# Patient Record
Sex: MALE | Race: WHITE | NOT HISPANIC OR LATINO | URBAN - METROPOLITAN AREA
[De-identification: names, ages, dates, MRNs, and addresses within clinical notes are randomized per-mention and may not be internally consistent; named-entity substitution may affect disease eponyms.]

---

## 2019-06-15 ENCOUNTER — EMERGENCY (EMERGENCY)
Facility: HOSPITAL | Age: 84
LOS: 1 days | Discharge: HOME | End: 2019-06-15
Attending: EMERGENCY MEDICINE | Admitting: EMERGENCY MEDICINE
Payer: MEDICARE

## 2019-06-15 VITALS
SYSTOLIC BLOOD PRESSURE: 184 MMHG | RESPIRATION RATE: 18 BRPM | HEART RATE: 60 BPM | DIASTOLIC BLOOD PRESSURE: 70 MMHG | TEMPERATURE: 98 F | WEIGHT: 149.91 LBS | OXYGEN SATURATION: 100 %

## 2019-06-15 VITALS
SYSTOLIC BLOOD PRESSURE: 152 MMHG | HEART RATE: 83 BPM | RESPIRATION RATE: 18 BRPM | OXYGEN SATURATION: 100 % | DIASTOLIC BLOOD PRESSURE: 61 MMHG
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
## 2019-06-15 DIAGNOSIS — S32.9XXA FRACTURE OF UNSPECIFIED PARTS OF LUMBOSACRAL SPINE AND PELVIS, INITIAL ENCOUNTER FOR CLOSED FRACTURE: ICD-10-CM

## 2019-06-15 DIAGNOSIS — S06.6X0A TRAUMATIC SUBARACHNOID HEMORRHAGE WITHOUT LOSS OF CONSCIOUSNESS, INITIAL ENCOUNTER: ICD-10-CM
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
## 2019-06-15 DIAGNOSIS — Z98.890 OTHER SPECIFIED POSTPROCEDURAL STATES: Chronic | ICD-10-CM

## 2019-06-15 DIAGNOSIS — Z23 ENCOUNTER FOR IMMUNIZATION: ICD-10-CM

## 2019-06-15 DIAGNOSIS — M25.551 PAIN IN RIGHT HIP: ICD-10-CM

## 2019-06-15 LAB
ALBUMIN SERPL ELPH-MCNC: 3.3 G/DL — LOW (ref 3.4–5)
ALP SERPL-CCNC: 61 U/L — SIGNIFICANT CHANGE UP (ref 40–120)
ALT FLD-CCNC: 21 U/L — SIGNIFICANT CHANGE UP (ref 12–42)
ANION GAP SERPL CALC-SCNC: 13 MMOL/L — SIGNIFICANT CHANGE UP (ref 9–16)
APTT BLD: 29 SEC — SIGNIFICANT CHANGE UP (ref 27.5–36.3)
AST SERPL-CCNC: 22 U/L — SIGNIFICANT CHANGE UP (ref 15–37)
BILIRUB SERPL-MCNC: 1.4 MG/DL — HIGH (ref 0.2–1.2)
BUN SERPL-MCNC: 18 MG/DL — SIGNIFICANT CHANGE UP (ref 7–23)
CALCIUM SERPL-MCNC: 9.1 MG/DL — SIGNIFICANT CHANGE UP (ref 8.5–10.5)
CHLORIDE SERPL-SCNC: 106 MMOL/L — SIGNIFICANT CHANGE UP (ref 96–108)
CO2 SERPL-SCNC: 23 MMOL/L — SIGNIFICANT CHANGE UP (ref 22–31)
CREAT SERPL-MCNC: 1.28 MG/DL — SIGNIFICANT CHANGE UP (ref 0.5–1.3)
GLUCOSE SERPL-MCNC: 127 MG/DL — HIGH (ref 70–99)
HCT VFR BLD CALC: 32.8 % — LOW (ref 39–50)
HCT VFR BLD CALC: 37.6 % — LOW (ref 39–50)
HGB BLD-MCNC: 11.2 G/DL — LOW (ref 13–17)
HGB BLD-MCNC: 12.6 G/DL — LOW (ref 13–17)
INR BLD: 1.04 — SIGNIFICANT CHANGE UP (ref 0.88–1.16)
LACTATE SERPL-SCNC: 1.6 MMOL/L — SIGNIFICANT CHANGE UP (ref 0.4–2)
LACTATE SERPL-SCNC: 2.1 MMOL/L — HIGH (ref 0.4–2)
MCHC RBC-ENTMCNC: 31.3 PG — SIGNIFICANT CHANGE UP (ref 27–34)
MCHC RBC-ENTMCNC: 32 PG — SIGNIFICANT CHANGE UP (ref 27–34)
MCHC RBC-ENTMCNC: 33.5 G/DL — SIGNIFICANT CHANGE UP (ref 32–36)
MCHC RBC-ENTMCNC: 34.1 G/DL — SIGNIFICANT CHANGE UP (ref 32–36)
MCV RBC AUTO: 93.3 FL — SIGNIFICANT CHANGE UP (ref 80–100)
MCV RBC AUTO: 93.7 FL — SIGNIFICANT CHANGE UP (ref 80–100)
PLATELET # BLD AUTO: 143 K/UL — LOW (ref 150–400)
PLATELET # BLD AUTO: 170 K/UL — SIGNIFICANT CHANGE UP (ref 150–400)
POTASSIUM SERPL-MCNC: 3.9 MMOL/L — SIGNIFICANT CHANGE UP (ref 3.5–5.3)
POTASSIUM SERPL-SCNC: 3.9 MMOL/L — SIGNIFICANT CHANGE UP (ref 3.5–5.3)
PROT SERPL-MCNC: 6.9 G/DL — SIGNIFICANT CHANGE UP (ref 6.4–8.2)
PROTHROM AB SERPL-ACNC: 11.5 SEC — SIGNIFICANT CHANGE UP (ref 10–12.9)
RBC # BLD: 3.5 M/UL — LOW (ref 4.2–5.8)
RBC # BLD: 4.03 M/UL — LOW (ref 4.2–5.8)
RBC # FLD: 13.7 % — SIGNIFICANT CHANGE UP (ref 10.3–14.5)
RBC # FLD: 13.8 % — SIGNIFICANT CHANGE UP (ref 10.3–14.5)
SODIUM SERPL-SCNC: 142 MMOL/L — SIGNIFICANT CHANGE UP (ref 132–145)
TROPONIN I SERPL-MCNC: <0.017 NG/ML — LOW (ref 0.02–0.06)
TROPONIN I SERPL-MCNC: <0.017 NG/ML — LOW (ref 0.02–0.06)
WBC # BLD: 10.4 K/UL — SIGNIFICANT CHANGE UP (ref 3.8–10.5)
WBC # BLD: 12.2 K/UL — HIGH (ref 3.8–10.5)
WBC # FLD AUTO: 10.4 K/UL — SIGNIFICANT CHANGE UP (ref 3.8–10.5)
WBC # FLD AUTO: 12.2 K/UL — HIGH (ref 3.8–10.5)

## 2019-06-15 PROCEDURE — 74177 CT ABD & PELVIS W/CONTRAST: CPT | Mod: 26

## 2019-06-15 PROCEDURE — 71045 X-RAY EXAM CHEST 1 VIEW: CPT | Mod: 26

## 2019-06-15 PROCEDURE — 93010 ELECTROCARDIOGRAM REPORT: CPT

## 2019-06-15 PROCEDURE — 71260 CT THORAX DX C+: CPT | Mod: 26

## 2019-06-15 PROCEDURE — 72192 CT PELVIS W/O DYE: CPT | Mod: 26,59

## 2019-06-15 PROCEDURE — 99291 CRITICAL CARE FIRST HOUR: CPT

## 2019-06-15 PROCEDURE — 70450 CT HEAD/BRAIN W/O DYE: CPT | Mod: 26

## 2019-06-15 RX ORDER — BACITRACIN ZINC 500 UNIT/G
1 OINTMENT IN PACKET (EA) TOPICAL ONCE
Refills: 0 | Status: COMPLETED | OUTPATIENT
Start: 2019-06-15 | End: 2019-06-15

## 2019-06-15 RX ORDER — ACETAMINOPHEN 500 MG
975 TABLET ORAL ONCE
Refills: 0 | Status: COMPLETED | OUTPATIENT
Start: 2019-06-15 | End: 2019-06-15

## 2019-06-15 RX ORDER — MORPHINE SULFATE 50 MG/1
4 CAPSULE, EXTENDED RELEASE ORAL ONCE
Refills: 0 | Status: DISCONTINUED | OUTPATIENT
Start: 2019-06-15 | End: 2019-06-15

## 2019-06-15 RX ORDER — LEVETIRACETAM 250 MG/1
1000 TABLET, FILM COATED ORAL ONCE
Refills: 0 | Status: COMPLETED | OUTPATIENT
Start: 2019-06-15 | End: 2019-06-15

## 2019-06-15 RX ORDER — SODIUM CHLORIDE 9 MG/ML
1000 INJECTION INTRAMUSCULAR; INTRAVENOUS; SUBCUTANEOUS ONCE
Refills: 0 | Status: COMPLETED | OUTPATIENT
Start: 2019-06-15 | End: 2019-06-15

## 2019-06-15 RX ORDER — TETANUS TOXOID, REDUCED DIPHTHERIA TOXOID AND ACELLULAR PERTUSSIS VACCINE, ADSORBED 5; 2.5; 8; 8; 2.5 [IU]/.5ML; [IU]/.5ML; UG/.5ML; UG/.5ML; UG/.5ML
0.5 SUSPENSION INTRAMUSCULAR ONCE
Refills: 0 | Status: COMPLETED | OUTPATIENT
Start: 2019-06-15 | End: 2019-06-15

## 2019-06-15 RX ADMIN — SODIUM CHLORIDE 1000 MILLILITER(S): 9 INJECTION INTRAMUSCULAR; INTRAVENOUS; SUBCUTANEOUS at 12:38

## 2019-06-15 RX ADMIN — MORPHINE SULFATE 4 MILLIGRAM(S): 50 CAPSULE, EXTENDED RELEASE ORAL at 15:10

## 2019-06-15 RX ADMIN — Medication 975 MILLIGRAM(S): at 12:37

## 2019-06-15 RX ADMIN — TETANUS TOXOID, REDUCED DIPHTHERIA TOXOID AND ACELLULAR PERTUSSIS VACCINE, ADSORBED 0.5 MILLILITER(S): 5; 2.5; 8; 8; 2.5 SUSPENSION INTRAMUSCULAR at 12:39

## 2019-06-15 RX ADMIN — LEVETIRACETAM 440 MILLIGRAM(S): 250 TABLET, FILM COATED ORAL at 14:26

## 2019-06-15 RX ADMIN — Medication 1 APPLICATION(S): at 12:39

## 2019-06-15 NOTE — ED PROVIDER NOTE - CARE PLAN
Principal Discharge DX:	SAH (subarachnoid hemorrhage)  Secondary Diagnosis:	Pelvic fracture  Secondary Diagnosis:	Fall, initial encounter

## 2019-06-15 NOTE — ED PROVIDER NOTE - PROGRESS NOTE DETAILS
CTH noted SAH with no midline shift or edema, GCS 15, no focal neuro deficits, pt reassessed at bedside, keep HOB elevated at 30, will load with 1g of keppra, NSG cx CT pelvic with +pelvic ramus fx, no hip involvement, given traumatic injury, will transfer to [facility] trauma service for further intervention case discussed with [place] trauma surgeon - Dr. Lemus, and ER PIC Dr. Joaquin, will transfer ER-ER

## 2019-06-15 NOTE — ED PROVIDER NOTE - OBJECTIVE STATEMENT
89 yo M with PMHx of basal cell CA, s/p resection, followed at [facility], HTN, HLD, BPH, not on any AC, last tetanus unknown, BIBA for R hip pain s/p fall. Pt reports getting up to go to the bathroom around 9am this morning, felt slightly lightheaded and generalized weakness, fell and hit his head against the corner of the night stand and landed on his R side.  Was able to get up but noted persistent pain and mild bleeding from the R facial region.  Denies other prodromes, LOC, HA, vertigo, SOB, CP, palpitations, N/V, change in ROM/sensation, abdominal/back/neck pain, focal weakness, change in vision/mental status/speech, paresthesia, and malaise. Pt is able to weight bear s/p fall 89 yo M with PMHx of basal cell CA, s/p resection, followed at [facility], HTN, HLD, BPH, and anxiety, not on any AC/antiplatelets, last tetanus unknown, BIBA for R hip pain s/p fall. Pt reports getting up to go to the bathroom around 9am this morning, felt slightly lightheaded and generalized weakness, fell and hit his head against the corner of the night stand and landed on his R side.  Was able to get up but noted persistent pain and mild bleeding from the R facial region.  Denies other prodromes, LOC, HA, vertigo, SOB, CP, palpitations, N/V, change in ROM/sensation, abdominal/back/neck pain, focal weakness, change in vision/mental status/speech, paresthesia, and malaise. Pt is able to weight bear s/p fall.  Pt lives alone, basic ADLs intact, generally ambulatory without any assistance

## 2019-06-15 NOTE — ED PROVIDER NOTE - PHYSICAL EXAMINATION
Vital Signs - nursing notes reviewed and confirmed  Gen - WDWN elderly M, NAD, comfortable and non-toxic appearing, speaking in full sentences   Skin - warm, dry, abrasions to the R lateral eyebrow region, L dorsum hand, no laceration or FB noted   HEENT - NC, +mild edema to the R parietal scalp region with no crepitus or deformity, PERRL, EOMI, no conjunctival injection, moist oral mucosa, TM intact b/l with good cone of lights, o/p clear with no erythema, edema, or exudate, uvula midline, airway patent, neck supple and NT, FROM, no JVD or carotid bruits b/l, no palpable nodes  CV - S1S2, R/R/R  Resp - respiration non-labored, CTAB, symmetric bs b/l, no r/r/w  GI - NABS, soft, ND, NT, no rebound or guarding, no CVAT b/l   MS - w/w/p, R hip and anterior pelvic rim TTP, no deformity or laxity, calves supple and NT, distal pulses symmetric b/l, brisk cap refills, +SILT  Neuro - AxOx3, no focal neuro deficits, CN II-XII grossly intact, able to weight bear with assistance Vital Signs - nursing notes reviewed and confirmed  Gen - WDWN elderly M, NAD, comfortable and non-toxic appearing, speaking in full sentences   Skin - warm, dry, abrasions to the R lateral eyebrow region, L dorsum hand, no laceration or FB noted   HEENT - NC, +mild edema and ecchymosis to the R temporal parietal scalp region with no crepitus or deformity, PERRL, EOMI, no conjunctival injection, moist oral mucosa, TM intact b/l with good cone of lights, o/p clear with no erythema, edema, or exudate, uvula midline, airway patent, neck supple and NT, FROM  CV - S1S2, R/R/R  Resp - respiration non-labored, CTAB, symmetric bs b/l, no r/r/w  GI - NABS, soft, ND, NT, no rebound or guarding, no CVAT b/l   MS - w/w/p, R hip and anterior pelvic rim TTP, no deformity or laxity, calves supple and NT, distal pulses symmetric b/l, brisk cap refills, +SILT  Neuro - AxOx3, no focal neuro deficits, CN II-XII grossly intact, unable to weight bear in the ED, +essential tremors

## 2019-06-15 NOTE — ED PROVIDER NOTE - ATTENDING CONTRIBUTION TO CARE
89 yo M with PMHx of basal cell CA, s/p resection, followed at [facility], HTN, HLD, BPH, not on any AC, last tetanus unknown, BIBA for R hip pain s/p fall. Pt reports getting up to go to the bathroom around 9am this morning, felt slightly lightheaded and generalized weakness, fell and hit his head against the corner of the night stand and landed on his R side.  Was able to get up but noted persistent pain and mild bleeding from the R facial region.     vss, afebrile, mild htn    exam: awake/alert GCS=15, joking with examiner.  Neg Bee's/Raccoons, right facial/temporal ecchymosis/abrasion with mild bleeding. PERRLA/EOMI  c-spine non-tender, no step-off  Lungs: CTA bilat  cor: rrr s mcr  abd: snt, + bs, no ecchymosis  ext: no deformity/tenderness. Pelvis stable to rock/compression    data: + SAH, no skull fx, no midline shift, + minimally displaced right superior ramus fx.    imp: 1) Mechanical fall         2) Stable pelvic fracture         3) Traumatic SAH    plan: Transfer trauma center. h/h stable 87 yo M with PMHx of basal cell CA, s/p resection, followed at [facility], HTN, HLD, BPH, not on any AC, last tetanus unknown, BIBA for R hip pain s/p fall. Pt reports getting up to go to the bathroom around 9am this morning, felt slightly lightheaded and generalized weakness, fell and hit his head against the corner of the night stand and landed on his R side.  Was able to get up but noted persistent pain and mild bleeding from the R facial region.     vss, afebrile, mild htn    exam: awake/alert GCS=15, joking with examiner.  Neg Bee's/Raccoons, right facial/temporal ecchymosis/abrasion with mild bleeding. PERRLA/EOMI  c-spine non-tender, no step-off  Lungs: CTA bilat  cor: rrr s mcr  abd: snt, + bs, no ecchymosis  ext: no deformity/tenderness. Pelvis stable to rock/compression    data: + SAH, no skull fx, no midline shift, + minimally displaced right superior ramus fx with small overiding hematoma. h.h #1 stable, initial lactic 2.1. Will rpt lactic/h/h after one litre crystalloid. If change, will place pelvic binder though isolated superior pubic ramus fx .    imp: 1) Mechanical fall         2) Stable pelvic fracture         3) Traumatic SAH    plan: Transfer trauma center. h/h stable 89 yo M with PMHx of basal cell CA, s/p resection, followed at [facility], HTN, HLD, BPH, not on any AC, last tetanus unknown, BIBA for R hip pain s/p fall. Pt reports getting up to go to the bathroom around 9am this morning, felt slightly lightheaded and generalized weakness, fell and hit his head against the corner of the night stand and landed on his R side.  Was able to get up but noted persistent pain and mild bleeding from the R facial region.     vss, afebrile, mild htn    exam: awake/alert GCS=15, joking with examiner.  Neg Bee's/Raccoons, right facial/temporal ecchymosis/abrasion with mild bleeding. PERRLA/EOMI  c-spine non-tender, no step-off.   Lungs: CTA bilat.  cor: rrr s mcr  abd: snt, + bs, no ecchymosis  ext: no deformity/tenderness. Pelvis stable to rock/compression    data: + SAH, no skull fx, no midline shift, + minimally displaced right superior ramus fx with small overiding hematoma. h.h #1 stable, initial lactic 2.1. Will rpt lactic/h/h after one litre crystalloid. If change, will place pelvic binder though isolated superior pubic ramus fx .    imp: 1) Mechanical fall         2) Stable pelvic fracture         3) Traumatic SAH    plan: Transfer trauma center. h/h stable

## 2019-06-15 NOTE — ED PROVIDER NOTE - PMH
Basal cell carcinoma    BPH (benign prostatic hyperplasia)    HLD (hyperlipidemia)    HTN (hypertension) Anxiety    Basal cell carcinoma    BPH (benign prostatic hyperplasia)    HLD (hyperlipidemia)    HTN (hypertension)

## 2019-06-15 NOTE — ED PROVIDER NOTE - CLINICAL SUMMARY MEDICAL DECISION MAKING FREE TEXT BOX
pt with R hip pain and facial trauma s/p fall at home, +lightheadedness but no other associated prodromes, no focal neuro deficits on exam, unable to weight bear in the ED, CTs with small SAH on the R with no midline shift and R pelvic ramus fx with hematoma, H/H stable, GCS 15, neuro exam stable, s/p dose of IV keppra, case discussed with [facility] trauma service and ER PIC, accepted for ER-ER transfer

## 2019-06-15 NOTE — ED ADULT TRIAGE NOTE - CHIEF COMPLAINT QUOTE
here for fall - unknown cause of fall- with + head injury, bruising noted to right side of eyebrow and bilateral hands with ecchymosis- pt takes no blood thinners-  h/o htn, anxiety

## 2019-06-15 NOTE — ED PROVIDER NOTE - DIAGNOSTIC INTERPRETATION
Xray (wet reads) interpreted by KRISTIN BLAS   CXR - Cardiac silhouette, aortic knob, mediastinal and hilar contours appear wnl, no acute consolidation, infiltrate, effusion, or PTX. No bony abnormalities noted Xray (wet reads) interpreted by KRISTIN BLAS   CXR - Cardiac silhouette, aortic knob, mediastinal and hilar contours appear tortuous, no acute consolidation, infiltrate, effusion, or PTX. No bony abnormalities noted

## 2021-09-16 NOTE — ED PROVIDER NOTE - PROGRESS NOTE
ERROR message on previous lab results for Urine Drug Screen. Patient returned call from yesterday. Was in formed of normal UDS and that script was sent to  to approve. He voiced understanding.  
Stable.